# Patient Record
Sex: MALE | Race: WHITE | NOT HISPANIC OR LATINO | URBAN - METROPOLITAN AREA
[De-identification: names, ages, dates, MRNs, and addresses within clinical notes are randomized per-mention and may not be internally consistent; named-entity substitution may affect disease eponyms.]

---

## 2017-03-06 ENCOUNTER — IMPORTED ENCOUNTER (OUTPATIENT)
Dept: URBAN - METROPOLITAN AREA CLINIC 1 | Facility: CLINIC | Age: 72
End: 2017-03-06

## 2017-03-06 PROBLEM — H25.813: Noted: 2017-03-06

## 2017-03-06 PROBLEM — H01.004: Noted: 2017-03-06

## 2017-03-06 PROBLEM — H01.001: Noted: 2017-03-06

## 2017-03-06 PROBLEM — H35.033: Noted: 2017-03-06

## 2017-03-06 PROBLEM — H04.123: Noted: 2017-03-06

## 2017-03-06 PROBLEM — H43.813: Noted: 2017-03-06

## 2017-03-06 PROCEDURE — 92014 COMPRE OPH EXAM EST PT 1/>: CPT

## 2017-03-06 NOTE — PATIENT DISCUSSION
1.  Cataract OU: Observe for now without intervention. The patient was advised to contact us if any change or worsening of vision2. Blepharitis anterior type OU - Begin Daily warm compresses and lid scrubs were recommended. 3. Dry Eyes OU - Use ATs TID OU routinely 4. PVD OU - stable RD precautions 5. Hypertensive Retinopathy OU- Stable continue HTN ControlLetter to PCP Return for an appointment in 1 yr 27 with Dr. Chely Curran.

## 2018-03-05 ENCOUNTER — IMPORTED ENCOUNTER (OUTPATIENT)
Dept: URBAN - METROPOLITAN AREA CLINIC 1 | Facility: CLINIC | Age: 73
End: 2018-03-05

## 2018-03-05 PROBLEM — H01.005: Noted: 2018-03-05

## 2018-03-05 PROBLEM — H43.813: Noted: 2018-03-05

## 2018-03-05 PROBLEM — H16.143: Noted: 2018-03-05

## 2018-03-05 PROBLEM — H01.002: Noted: 2018-03-05

## 2018-03-05 PROBLEM — H04.123: Noted: 2018-03-05

## 2018-03-05 PROBLEM — H25.813: Noted: 2018-03-05

## 2018-03-05 PROBLEM — H35.033: Noted: 2018-03-05

## 2018-03-05 PROCEDURE — 92014 COMPRE OPH EXAM EST PT 1/>: CPT

## 2018-03-05 NOTE — PATIENT DISCUSSION
1.  Cataract OU: Observe for now without intervention. The patient was advised to contact us if any change or worsening of vision2. Dry Eyes OU- Controlled. The continuation of artificial tears were recommended. 3.  Blepharitis anterior type OU- Controlled. Continue daily warm compresses and lid scrubs were recommended. 4. GR I Hypertensive Retinopathy OU- Stable continue HTN Control5. PVD OU- Old stable. 6.  Patient defers the refraction at today's visit7. Return for an appointment for a 30/glare in 1 year with Dr. Nicolle Sarah.

## 2018-08-20 NOTE — PATIENT DISCUSSION
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  - NO NEED FOR TREATMENT AT THIS TIME. PT TO CALL IF ANY DECREASE IN VISION, INCREASE IN FLOATERS OR FLASHES OF LIGHT. FOLLOW UP AS SCHEDULED.

## 2019-03-05 ENCOUNTER — IMPORTED ENCOUNTER (OUTPATIENT)
Dept: URBAN - METROPOLITAN AREA CLINIC 1 | Facility: CLINIC | Age: 74
End: 2019-03-05

## 2019-03-05 PROBLEM — H01.004: Noted: 2019-03-05

## 2019-03-05 PROBLEM — H25.813: Noted: 2019-03-05

## 2019-03-05 PROBLEM — H01.001: Noted: 2019-03-05

## 2019-03-05 PROCEDURE — 92014 COMPRE OPH EXAM EST PT 1/>: CPT

## 2019-03-05 PROCEDURE — 92015 DETERMINE REFRACTIVE STATE: CPT

## 2019-03-05 NOTE — PATIENT DISCUSSION
1.  Cataract OU: Observe for now without intervention. The patient was advised to contact us if any change or worsening of vision2. Anterior Blepharitis OU - Cont Daily warm compresses and lid scrubs were recommended. 3. Dry Eyes OU - Continue ATs TID OU routinely 4. PVD OU - stable RD precautions 5. Hypertensive Retinopathy OU- Stable continue HTN ControlLetter to [address] for an appointment in 1 yr 27 with Dr. Melvina Basurto.

## 2020-03-10 ENCOUNTER — IMPORTED ENCOUNTER (OUTPATIENT)
Dept: URBAN - METROPOLITAN AREA CLINIC 1 | Facility: CLINIC | Age: 75
End: 2020-03-10

## 2020-03-10 PROBLEM — H35.3131: Noted: 2020-03-10

## 2020-03-10 PROBLEM — H35.033: Noted: 2020-03-10

## 2020-03-10 PROBLEM — H43.813: Noted: 2020-03-10

## 2020-03-10 PROBLEM — H04.123: Noted: 2020-03-10

## 2020-03-10 PROBLEM — H01.001: Noted: 2020-03-10

## 2020-03-10 PROBLEM — H01.004: Noted: 2020-03-10

## 2020-03-10 PROBLEM — H25.813: Noted: 2020-03-10

## 2020-03-10 PROCEDURE — 92250 FUNDUS PHOTOGRAPHY W/I&R: CPT

## 2020-03-10 PROCEDURE — 92014 COMPRE OPH EXAM EST PT 1/>: CPT

## 2020-03-10 NOTE — PATIENT DISCUSSION
1.  Cataract OU: Observe for now without intervention. The patient was advised to contact us if any change or worsening of vision2. ARMD OU Early/dry/stable. Family hx (mother). Baseline Macular photos done today. Importance of daily AREDS II study multivitamin and Amsler Grid checks discussed with patient. Patient to follow-up immediately with any new onset of decreased vision and/or metamorphopsia. 3. Dry Eyes OU - Recommend ATs TID OU routinely 4. Anterior Blepharitis OU - Daily Hot compresses and lid scrubs were recommended. 5. GR I Hypertensive Retinopathy OU- Stable continue HTN Control6. PVD OU - RD precautions. Patient deferred Manifest Rx today. Return for an appointment in 1 year 30/MAC Photos OU with Dr. Dimas Liu.

## 2021-01-04 ENCOUNTER — IMPORTED ENCOUNTER (OUTPATIENT)
Dept: URBAN - METROPOLITAN AREA CLINIC 1 | Facility: CLINIC | Age: 76
End: 2021-01-04

## 2021-01-04 PROBLEM — H04.123: Noted: 2021-01-04

## 2021-01-04 PROBLEM — H35.3131: Noted: 2021-01-04

## 2021-01-04 PROBLEM — H01.001: Noted: 2021-01-04

## 2021-01-04 PROBLEM — H01.004: Noted: 2021-01-04

## 2021-01-04 PROBLEM — H25.813: Noted: 2021-01-04

## 2021-01-04 PROCEDURE — 92015 DETERMINE REFRACTIVE STATE: CPT

## 2021-01-04 PROCEDURE — 92014 COMPRE OPH EXAM EST PT 1/>: CPT

## 2021-01-04 NOTE — PATIENT DISCUSSION
1.  Cataract OU - Visually Significant secondary to glare OU. Discussed the risks benefits alternatives and limitations of cataract surgery. The patient stated a full understanding and a desire to proceed with the procedure. The patient will need to return for preop appointment with cataract measurements and to have any additional questions answered and start pre-operative eye drops as directed. Not ideal MF Candidate / Guarded visual prognosis secondary to ARMD. Phaco PCL OS then OD Otherwise follow-up2. ARMD OU early/dry/stable. Family Hx (mother). Importance of daily AREDS II study multivitamin and Amsler Grid checks discussed with patient. Patient to follow-up immediately with any new onset of decreased vision and/or metamorphopsia. 3. Dry Eyes OU - Recommend ATs TID OU routinely. 4.  Anterior Blepharitis OU - Continue daily Hot compresses and lid scrubs were recommended. 5. GR I Hypertensive Retinopathy OU - Stable continue HTN Control. 6.  PVD OU - Stable RD precautions. 7.  RLL Papilloma - Benign. Observe. Return for an appointment in [address] [place] [address]  with Dr. Lorena Bansal.

## 2021-01-18 ENCOUNTER — IMPORTED ENCOUNTER (OUTPATIENT)
Dept: URBAN - METROPOLITAN AREA CLINIC 1 | Facility: CLINIC | Age: 76
End: 2021-01-18

## 2021-01-18 PROBLEM — H25.812: Noted: 2021-01-18

## 2021-01-18 PROCEDURE — 92136 OPHTHALMIC BIOMETRY: CPT

## 2021-01-18 NOTE — PATIENT DISCUSSION
1. Cataract OS:  Visually Significant secondary to glare discussed the risks benefits alternatives and limitations of cataract surgery. The patient stated a full understanding and a desire to proceed with the procedure. Discussed with patient if PO Gtts are more than $120 for all three combined when filling at their Pharmacy please call our office to request generic substitutions. Pt understands they will need glasses post-op to achieve their best corrected vision. *Guarded prognosis due to ARMD Phaco PCL OS  Lifestyle Questionnaire Completed. Return for an appointment for Return as scheduled with Dr. Renetta Chase.

## 2021-01-27 ENCOUNTER — IMPORTED ENCOUNTER (OUTPATIENT)
Dept: URBAN - METROPOLITAN AREA CLINIC 1 | Facility: CLINIC | Age: 76
End: 2021-01-27

## 2021-01-28 ENCOUNTER — IMPORTED ENCOUNTER (OUTPATIENT)
Dept: URBAN - METROPOLITAN AREA CLINIC 1 | Facility: CLINIC | Age: 76
End: 2021-01-28

## 2021-01-28 PROBLEM — Z96.1: Noted: 2021-01-28

## 2021-01-28 PROCEDURE — 99024 POSTOP FOLLOW-UP VISIT: CPT

## 2021-01-28 NOTE — PATIENT DISCUSSION
POD#1 CE/IOL Toric OS doing well. Corneal Edema noted today. *H/o ARMD. Randa Sinning in place. Use Prednisolone BID OS Prolensa Qdaily OS Ocuflox TID OS: Use all three gtts through completion of PO gtt chart regimen/ Per our instructions given to patient.   Post op Warnings Reiterated RTC as scheduled

## 2021-02-05 ENCOUNTER — IMPORTED ENCOUNTER (OUTPATIENT)
Dept: URBAN - METROPOLITAN AREA CLINIC 1 | Facility: CLINIC | Age: 76
End: 2021-02-05

## 2021-02-05 PROBLEM — H25.811: Noted: 2021-02-05

## 2021-02-05 PROCEDURE — 92136 OPHTHALMIC BIOMETRY: CPT

## 2021-02-05 NOTE — PATIENT DISCUSSION
1.  Cataract OD: Visually Significant secondary to glare discussed the risks benefits alternatives and limitations of cataract surgery. The patient stated a full understanding and a desire to proceed with the procedure. Discussed with patient if PO Gtts are more than $120 for all three combined when filling at their Pharmacy please call our office to request generic substitutions. Pt understands they will need glasses post-op to achieve their best corrected vision. *Guarded prognosis due to h/o ARMD Phaco PCL OD 2. POW#3  CE/IOL (Toric) OS doing well. Corneal Edema resolved today. *H/o ARMD. Christopher Skates in place. Use Prednisolone BID OS && Prolensa Qdaily OS: Use through completion of po gtt regimen.  F/u as scheduled 2nd eye

## 2021-02-10 ENCOUNTER — IMPORTED ENCOUNTER (OUTPATIENT)
Dept: URBAN - METROPOLITAN AREA CLINIC 1 | Facility: CLINIC | Age: 76
End: 2021-02-10

## 2021-02-11 ENCOUNTER — IMPORTED ENCOUNTER (OUTPATIENT)
Dept: URBAN - METROPOLITAN AREA CLINIC 1 | Facility: CLINIC | Age: 76
End: 2021-02-11

## 2021-02-11 PROBLEM — Z96.1: Noted: 2021-02-11

## 2021-02-11 PROCEDURE — 99024 POSTOP FOLLOW-UP VISIT: CPT

## 2021-03-05 ENCOUNTER — IMPORTED ENCOUNTER (OUTPATIENT)
Dept: URBAN - METROPOLITAN AREA CLINIC 1 | Facility: CLINIC | Age: 76
End: 2021-03-05

## 2021-03-05 PROBLEM — Z96.1: Noted: 2021-03-05

## 2021-03-05 PROCEDURE — 99024 POSTOP FOLLOW-UP VISIT: CPT

## 2021-03-05 NOTE — PATIENT DISCUSSION
POM#1 CE/IOL OU (Toric OU) qdoing well. Use Prednisolone BID OD & Prolensa Qdaily OD: Use through completion of po gtt regimen. MRX for glasses given. Return for an appointment in 6 months 27 with Dr. Senthil Cooley.

## 2021-10-05 ENCOUNTER — IMPORTED ENCOUNTER (OUTPATIENT)
Dept: URBAN - METROPOLITAN AREA CLINIC 1 | Facility: CLINIC | Age: 76
End: 2021-10-05

## 2021-10-05 PROBLEM — H35.3131: Noted: 2021-10-05

## 2021-10-05 PROBLEM — H04.123: Noted: 2021-10-05

## 2021-10-05 PROBLEM — Z96.1: Noted: 2021-10-05

## 2021-10-05 PROBLEM — H26.493: Noted: 2021-10-05

## 2021-10-05 PROCEDURE — 99214 OFFICE O/P EST MOD 30 MIN: CPT

## 2021-10-05 NOTE — PATIENT DISCUSSION
1.  ARMD OU Early/dry/stable. Importance of daily AREDS II study multivitamin and Amsler Grid checks discussed with patient. Patient to follow-up immediately with any new onset of decreased vision and/or metamorphopsia. 2. Dry Eyes OU - Recommend ATs TID OU routinely 3. PCO OU: (Posterior Capsule Opacification)   Observe and consider yag cap when pt feels pco visually significant and visual acuity decreases to appropriate level. 4. Pseudophakia OU - (Toric OU) 5. Anterior Blepharitis OU - Continue daily Hot compresses and lid scrubs were recommended. 6. GR I Hypertensive Retinopathy OU - Stable continue HTN Control. 7.  PVD OU - Stable RD precautions. 8.  RLL Papilloma - Benign. Observe. Return for an appointment in 6 months DFE with Dr. Perry Cooley.

## 2022-04-02 ASSESSMENT — VISUAL ACUITY
OD_GLARE: 20/50
OD_SC: 20/20
OD_GLARE: 20/60
OS_CC: J1+
OS_SC: 20/25
OS_CC: 20/20
OS_GLARE: 20/60
OD_SC: 20/20
OD_CC: 20/20
OD_CC: 20/20
OD_SC: 20/20
OD_CC: J1+
OD_SC: 20/20
OS_CC: 20/20
OS_SC: 20/20
OS_GLARE: 20/50
OS_SC: 20/20
OD_CC: 20/25
OS_CC: J1
OD_GLARE: 20/50
OD_CC: J1+
OD_CC: 20/20
OS_CC: 20/25
OD_GLARE: 20/60
OD_CC: J1
OS_GLARE: 20/50
OD_SC: 20/20
OS_SC: 20/20
OS_SC: 20/20
OS_GLARE: 20/60
OS_CC: J1+
OD_CC: J1+
OS_CC: J1+
OS_CC: 20/20

## 2022-04-02 ASSESSMENT — KERATOMETRY
OS_K1POWER_DIOPTERS: 42.00
OD_AXISANGLE2_DEGREES: 048
OD_K1POWER_DIOPTERS: 41.50
OS_AXISANGLE2_DEGREES: 070
OD_AXISANGLE_DEGREES: 138
OD_K2POWER_DIOPTERS: 42.50
OS_K2POWER_DIOPTERS: 41.25
OS_AXISANGLE_DEGREES: 160

## 2022-04-02 ASSESSMENT — TONOMETRY
OS_IOP_MMHG: 17
OD_IOP_MMHG: 15
OD_IOP_MMHG: 12
OD_IOP_MMHG: 12
OS_IOP_MMHG: 15
OD_IOP_MMHG: 12
OS_IOP_MMHG: 12
OD_IOP_MMHG: 13
OD_IOP_MMHG: 12
OS_IOP_MMHG: 12
OS_IOP_MMHG: 12
OD_IOP_MMHG: 14
OS_IOP_MMHG: 13
OS_IOP_MMHG: 12
OS_IOP_MMHG: 15
OD_IOP_MMHG: 12
OS_IOP_MMHG: 13
OS_IOP_MMHG: 14

## 2022-04-05 ENCOUNTER — COMPREHENSIVE EXAM (OUTPATIENT)
Dept: URBAN - METROPOLITAN AREA CLINIC 1 | Facility: CLINIC | Age: 77
End: 2022-04-05

## 2022-04-05 DIAGNOSIS — H35.3131: ICD-10-CM

## 2022-04-05 DIAGNOSIS — H04.123: ICD-10-CM

## 2022-04-05 DIAGNOSIS — H26.493: ICD-10-CM

## 2022-04-05 DIAGNOSIS — Z96.1: ICD-10-CM

## 2022-04-05 PROCEDURE — 92012 INTRM OPH EXAM EST PATIENT: CPT

## 2022-04-05 ASSESSMENT — TONOMETRY
OS_IOP_MMHG: 12
OD_IOP_MMHG: 13

## 2022-04-05 ASSESSMENT — VISUAL ACUITY
OS_SC: 20/20 -1
OS_SC: J3
OD_SC: J3
OD_SC: 20/20

## 2022-10-04 ENCOUNTER — COMPREHENSIVE EXAM (OUTPATIENT)
Dept: URBAN - METROPOLITAN AREA CLINIC 1 | Facility: CLINIC | Age: 77
End: 2022-10-04

## 2022-10-04 DIAGNOSIS — Z96.1: ICD-10-CM

## 2022-10-04 DIAGNOSIS — H01.021: ICD-10-CM

## 2022-10-04 DIAGNOSIS — D23.112: ICD-10-CM

## 2022-10-04 DIAGNOSIS — H43.813: ICD-10-CM

## 2022-10-04 DIAGNOSIS — H04.123: ICD-10-CM

## 2022-10-04 DIAGNOSIS — H01.024: ICD-10-CM

## 2022-10-04 DIAGNOSIS — H35.033: ICD-10-CM

## 2022-10-04 DIAGNOSIS — H26.493: ICD-10-CM

## 2022-10-04 DIAGNOSIS — H35.3131: ICD-10-CM

## 2022-10-04 PROCEDURE — 99214 OFFICE O/P EST MOD 30 MIN: CPT

## 2022-10-04 ASSESSMENT — VISUAL ACUITY
OD_BAT: 20/30
OD_SC: 20/20
OS_BAT: 20/30
OS_SC: 20/20-1
OU_CC: J1+

## 2022-10-04 ASSESSMENT — TONOMETRY
OD_IOP_MMHG: 12
OS_IOP_MMHG: 13

## 2022-12-16 ENCOUNTER — CLINIC PROCEDURE ONLY (OUTPATIENT)
Dept: URBAN - METROPOLITAN AREA CLINIC 1 | Facility: CLINIC | Age: 77
End: 2022-12-16

## 2022-12-16 PROCEDURE — 66821 AFTER CATARACT LASER SURGERY: CPT

## 2022-12-16 ASSESSMENT — VISUAL ACUITY
OD_BAT: 20/50
OS_SC: 20/20
OS_BAT: 20/50
OD_SC: 20/20

## 2022-12-16 NOTE — PROCEDURE NOTE: CLINICAL
PROCEDURE NOTE: YAG Capsulotomy OD. Diagnosis: Posterior Capsular Opacity. Anesthesia: Topical. The purpose and nature of the procedure, possible alternative methods of treatment, the risks involved and the possibility of complications were discussed with patient. The Patient wishes to proceed and the consent was signed. 1 gtt Prolensa applied. The laser was then performed under topical anesthesia with no complications. Post op instructions were given to patient as well as a follow-up appointment. Patient was advised to call our office if any questions or concerns. Leo German

## 2023-04-18 ENCOUNTER — POST-OP (OUTPATIENT)
Dept: URBAN - METROPOLITAN AREA CLINIC 1 | Facility: CLINIC | Age: 78
End: 2023-04-18

## 2023-04-18 DIAGNOSIS — Z96.1: ICD-10-CM

## 2023-04-18 PROCEDURE — 99024 POSTOP FOLLOW-UP VISIT: CPT

## 2023-04-18 ASSESSMENT — TONOMETRY
OD_IOP_MMHG: 15
OS_IOP_MMHG: 15

## 2023-04-18 ASSESSMENT — VISUAL ACUITY
OD_SC: 20/20
OS_SC: 20/20-2

## 2023-10-05 ENCOUNTER — COMPREHENSIVE EXAM (OUTPATIENT)
Dept: URBAN - METROPOLITAN AREA CLINIC 1 | Facility: CLINIC | Age: 78
End: 2023-10-05

## 2023-10-05 DIAGNOSIS — H35.033: ICD-10-CM

## 2023-10-05 DIAGNOSIS — H04.123: ICD-10-CM

## 2023-10-05 DIAGNOSIS — H35.3131: ICD-10-CM

## 2023-10-05 PROCEDURE — 99214 OFFICE O/P EST MOD 30 MIN: CPT

## 2023-10-05 ASSESSMENT — TONOMETRY
OS_IOP_MMHG: 13
OD_IOP_MMHG: 13

## 2023-10-05 ASSESSMENT — VISUAL ACUITY
OD_SC: 20/20
OS_SC: 20/20

## 2024-04-11 ENCOUNTER — FOLLOW UP (OUTPATIENT)
Dept: URBAN - METROPOLITAN AREA CLINIC 1 | Facility: CLINIC | Age: 79
End: 2024-04-11

## 2024-04-11 DIAGNOSIS — H35.3131: ICD-10-CM

## 2024-04-11 PROCEDURE — 92134 CPTRZ OPH DX IMG PST SGM RTA: CPT

## 2024-04-11 PROCEDURE — 99213 OFFICE O/P EST LOW 20 MIN: CPT

## 2024-04-11 ASSESSMENT — VISUAL ACUITY
OS_SC: 20/20-2
OD_SC: 20/20
OU_SC: J1
OU_CC: J1+

## 2024-04-11 ASSESSMENT — TONOMETRY
OD_IOP_MMHG: 12
OS_IOP_MMHG: 13

## 2024-11-26 ENCOUNTER — COMPREHENSIVE EXAM (OUTPATIENT)
Dept: URBAN - METROPOLITAN AREA CLINIC 1 | Facility: CLINIC | Age: 79
End: 2024-11-26

## 2024-11-26 DIAGNOSIS — H35.3132: ICD-10-CM

## 2024-11-26 DIAGNOSIS — H04.123: ICD-10-CM

## 2024-11-26 DIAGNOSIS — H43.813: ICD-10-CM

## 2024-11-26 PROCEDURE — 99214 OFFICE O/P EST MOD 30 MIN: CPT

## 2025-05-30 ENCOUNTER — FOLLOW UP (OUTPATIENT)
Age: 80
End: 2025-05-30

## 2025-05-30 DIAGNOSIS — H35.3132: ICD-10-CM

## 2025-05-30 PROCEDURE — 99213 OFFICE O/P EST LOW 20 MIN: CPT

## 2025-05-30 PROCEDURE — 92134 CPTRZ OPH DX IMG PST SGM RTA: CPT
